# Patient Record
Sex: MALE | Race: BLACK OR AFRICAN AMERICAN | NOT HISPANIC OR LATINO | Employment: OTHER | RURAL
[De-identification: names, ages, dates, MRNs, and addresses within clinical notes are randomized per-mention and may not be internally consistent; named-entity substitution may affect disease eponyms.]

---

## 2022-02-22 ENCOUNTER — HOSPITAL ENCOUNTER (EMERGENCY)
Facility: HOSPITAL | Age: 62
Discharge: HOME | End: 2022-02-22
Attending: EMERGENCY MEDICINE
Payer: OTHER GOVERNMENT

## 2022-02-22 VITALS
BODY MASS INDEX: 28.49 KG/M2 | WEIGHT: 215 LBS | TEMPERATURE: 99 F | RESPIRATION RATE: 16 BRPM | OXYGEN SATURATION: 96 % | HEIGHT: 73 IN | HEART RATE: 68 BPM | DIASTOLIC BLOOD PRESSURE: 72 MMHG | SYSTOLIC BLOOD PRESSURE: 159 MMHG

## 2022-02-22 DIAGNOSIS — R07.9 CHEST PAIN, UNSPECIFIED TYPE: Primary | ICD-10-CM

## 2022-02-22 DIAGNOSIS — R10.9 ABDOMINAL PAIN: ICD-10-CM

## 2022-02-22 DIAGNOSIS — R07.9 CHEST PAIN: ICD-10-CM

## 2022-02-22 DIAGNOSIS — I10 HYPERTENSION, UNSPECIFIED TYPE: ICD-10-CM

## 2022-02-22 LAB
ALBUMIN SERPL BCP-MCNC: 3.4 G/DL (ref 3.5–5)
ALBUMIN/GLOB SERPL: 0.9 {RATIO}
ALP SERPL-CCNC: 113 U/L (ref 45–115)
ALT SERPL W P-5'-P-CCNC: 18 U/L (ref 16–61)
AMPHET UR QL SCN: NEGATIVE
ANION GAP SERPL CALCULATED.3IONS-SCNC: 9 MMOL/L (ref 7–16)
AST SERPL W P-5'-P-CCNC: 17 U/L (ref 15–37)
BARBITURATES UR QL SCN: NEGATIVE
BASOPHILS # BLD AUTO: 0.02 K/UL (ref 0–0.2)
BASOPHILS NFR BLD AUTO: 0.3 % (ref 0–1)
BENZODIAZ METAB UR QL SCN: POSITIVE
BILIRUB SERPL-MCNC: 0.2 MG/DL (ref 0–1.2)
BILIRUB UR QL STRIP: NEGATIVE
BUN SERPL-MCNC: 14 MG/DL (ref 7–18)
BUN/CREAT SERPL: 13 (ref 6–20)
CALCIUM SERPL-MCNC: 8.4 MG/DL (ref 8.5–10.1)
CANNABINOIDS UR QL SCN: NEGATIVE
CHLORIDE SERPL-SCNC: 106 MMOL/L (ref 98–107)
CLARITY UR: CLEAR
CO2 SERPL-SCNC: 27 MMOL/L (ref 21–32)
COCAINE UR QL SCN: NEGATIVE
COLOR UR: YELLOW
CREAT SERPL-MCNC: 1.1 MG/DL (ref 0.7–1.3)
D DIMER PPP FEU-MCNC: 1 ΜG/ML (ref 0–0.47)
DIFFERENTIAL METHOD BLD: ABNORMAL
EOSINOPHIL # BLD AUTO: 0.17 K/UL (ref 0–0.5)
EOSINOPHIL NFR BLD AUTO: 2.5 % (ref 1–4)
ERYTHROCYTE [DISTWIDTH] IN BLOOD BY AUTOMATED COUNT: 15.7 % (ref 11.5–14.5)
FLUAV AG UPPER RESP QL IA.RAPID: NEGATIVE
FLUBV AG UPPER RESP QL IA.RAPID: NEGATIVE
GLOBULIN SER-MCNC: 3.7 G/DL (ref 2–4)
GLUCOSE SERPL-MCNC: 89 MG/DL (ref 74–106)
GLUCOSE UR STRIP-MCNC: NEGATIVE MG/DL
HCT VFR BLD AUTO: 39 % (ref 40–54)
HGB BLD-MCNC: 13.2 G/DL (ref 13.5–18)
IMM GRANULOCYTES # BLD AUTO: 0.02 K/UL (ref 0–0.04)
IMM GRANULOCYTES NFR BLD: 0.3 % (ref 0–0.4)
KETONES UR STRIP-SCNC: NEGATIVE MG/DL
LEUKOCYTE ESTERASE UR QL STRIP: NEGATIVE
LYMPHOCYTES # BLD AUTO: 3.04 K/UL (ref 1–4.8)
LYMPHOCYTES NFR BLD AUTO: 43.9 % (ref 27–41)
MAGNESIUM SERPL-MCNC: 2.2 MG/DL (ref 1.7–2.3)
MCH RBC QN AUTO: 30.6 PG (ref 27–31)
MCHC RBC AUTO-ENTMCNC: 33.8 G/DL (ref 32–36)
MCV RBC AUTO: 90.3 FL (ref 80–96)
MONOCYTES # BLD AUTO: 0.78 K/UL (ref 0–0.8)
MONOCYTES NFR BLD AUTO: 11.3 % (ref 2–6)
MPC BLD CALC-MCNC: 9.7 FL (ref 9.4–12.4)
NEUTROPHILS # BLD AUTO: 2.89 K/UL (ref 1.8–7.7)
NEUTROPHILS NFR BLD AUTO: 41.7 % (ref 53–65)
NITRITE UR QL STRIP: NEGATIVE
NT-PROBNP SERPL-MCNC: 117 PG/ML (ref 1–125)
OPIATES UR QL SCN: POSITIVE
PCP UR QL SCN: NEGATIVE
PH UR STRIP: 7 PH UNITS
PLATELET # BLD AUTO: 287 K/UL (ref 150–400)
POTASSIUM SERPL-SCNC: 3.3 MMOL/L (ref 3.5–5.1)
PROT SERPL-MCNC: 7.1 G/DL (ref 6.4–8.2)
PROT UR QL STRIP: NEGATIVE
RBC # BLD AUTO: 4.32 M/UL (ref 4.6–6.2)
RBC # UR STRIP: NEGATIVE /UL
SARS-COV+SARS-COV-2 AG RESP QL IA.RAPID: NEGATIVE
SODIUM SERPL-SCNC: 139 MMOL/L (ref 136–145)
SP GR UR STRIP: 1.02
TROPONIN I SERPL HS-MCNC: 7.2 PG/ML
UROBILINOGEN UR STRIP-ACNC: 0.2 MG/DL
WBC # BLD AUTO: 6.92 K/UL (ref 4.5–11)

## 2022-02-22 PROCEDURE — 80307 DRUG TEST PRSMV CHEM ANLYZR: CPT | Performed by: EMERGENCY MEDICINE

## 2022-02-22 PROCEDURE — 99284 EMERGENCY DEPT VISIT MOD MDM: CPT | Mod: ,,, | Performed by: EMERGENCY MEDICINE

## 2022-02-22 PROCEDURE — 96360 HYDRATION IV INFUSION INIT: CPT | Mod: 59

## 2022-02-22 PROCEDURE — 81003 URINALYSIS AUTO W/O SCOPE: CPT | Performed by: EMERGENCY MEDICINE

## 2022-02-22 PROCEDURE — 93010 ELECTROCARDIOGRAM REPORT: CPT | Mod: ,,, | Performed by: EMERGENCY MEDICINE

## 2022-02-22 PROCEDURE — 85378 FIBRIN DEGRADE SEMIQUANT: CPT | Performed by: INTERNAL MEDICINE

## 2022-02-22 PROCEDURE — 99285 EMERGENCY DEPT VISIT HI MDM: CPT | Mod: 25

## 2022-02-22 PROCEDURE — 84484 ASSAY OF TROPONIN QUANT: CPT | Performed by: EMERGENCY MEDICINE

## 2022-02-22 PROCEDURE — 93010 EKG 12-LEAD: ICD-10-PCS | Mod: ,,, | Performed by: EMERGENCY MEDICINE

## 2022-02-22 PROCEDURE — 25000003 PHARM REV CODE 250: Performed by: EMERGENCY MEDICINE

## 2022-02-22 PROCEDURE — 36415 COLL VENOUS BLD VENIPUNCTURE: CPT | Performed by: EMERGENCY MEDICINE

## 2022-02-22 PROCEDURE — 93005 ELECTROCARDIOGRAM TRACING: CPT

## 2022-02-22 PROCEDURE — 80053 COMPREHEN METABOLIC PANEL: CPT | Performed by: EMERGENCY MEDICINE

## 2022-02-22 PROCEDURE — 83735 ASSAY OF MAGNESIUM: CPT | Performed by: EMERGENCY MEDICINE

## 2022-02-22 PROCEDURE — 83880 ASSAY OF NATRIURETIC PEPTIDE: CPT | Performed by: EMERGENCY MEDICINE

## 2022-02-22 PROCEDURE — 87428 SARSCOV & INF VIR A&B AG IA: CPT | Performed by: EMERGENCY MEDICINE

## 2022-02-22 PROCEDURE — 25500020 PHARM REV CODE 255: Performed by: INTERNAL MEDICINE

## 2022-02-22 PROCEDURE — 85025 COMPLETE CBC W/AUTO DIFF WBC: CPT | Performed by: EMERGENCY MEDICINE

## 2022-02-22 PROCEDURE — 99284 PR EMERGENCY DEPT VISIT,LEVEL IV: ICD-10-PCS | Mod: ,,, | Performed by: EMERGENCY MEDICINE

## 2022-02-22 RX ORDER — LOSARTAN POTASSIUM 100 MG/1
50 TABLET ORAL DAILY
COMMUNITY

## 2022-02-22 RX ORDER — SERTRALINE HYDROCHLORIDE 50 MG/1
50 TABLET, FILM COATED ORAL DAILY
COMMUNITY

## 2022-02-22 RX ORDER — ASCORBIC ACID 500 MG
500 TABLET ORAL DAILY
COMMUNITY

## 2022-02-22 RX ORDER — HYOSCYAMINE SULFATE 0.12 MG/1
TABLET, CHEWABLE ORAL
COMMUNITY
Start: 2021-11-12

## 2022-02-22 RX ORDER — ACETAMINOPHEN 325 MG/1
325 TABLET ORAL EVERY 6 HOURS PRN
COMMUNITY

## 2022-02-22 RX ORDER — ERGOCALCIFEROL 1.25 MG/1
50000 CAPSULE ORAL
COMMUNITY

## 2022-02-22 RX ORDER — PRAVASTATIN SODIUM 20 MG/1
20 TABLET ORAL DAILY
COMMUNITY

## 2022-02-22 RX ORDER — DILTIAZEM HYDROCHLORIDE 120 MG/1
180 CAPSULE, COATED, EXTENDED RELEASE ORAL 2 TIMES DAILY
COMMUNITY

## 2022-02-22 RX ORDER — DOXYCYCLINE 100 MG/1
100 CAPSULE ORAL 2 TIMES DAILY
COMMUNITY
End: 2022-10-01

## 2022-02-22 RX ORDER — ASPIRIN 325 MG
325 TABLET ORAL DAILY
COMMUNITY

## 2022-02-22 RX ORDER — DICYCLOMINE HYDROCHLORIDE 20 MG/1
20 TABLET ORAL 3 TIMES DAILY PRN
COMMUNITY
Start: 2021-11-30

## 2022-02-22 RX ORDER — CLONIDINE HYDROCHLORIDE 0.1 MG/1
0.1 TABLET ORAL 3 TIMES DAILY
COMMUNITY

## 2022-02-22 RX ADMIN — IOPAMIDOL 100 ML: 755 INJECTION, SOLUTION INTRAVENOUS at 08:02

## 2022-02-22 RX ADMIN — SODIUM CHLORIDE 1000 ML: 9 INJECTION, SOLUTION INTRAVENOUS at 06:02

## 2022-02-23 NOTE — ED NOTES
Pt continues to await results and ERP orders no change in pt status. Pain is a 5 on 0-10 scale. Wife at bedside.

## 2022-02-23 NOTE — ED PROVIDER NOTES
Encounter Date: 2/22/2022       History     Chief Complaint   Patient presents with    Chest Pain    Shortness of Breath    Weakness    Nausea     Pt srtates he has a headache nauseated chest pain weakness and sob onset 2-3 weeks worse today     HPI  Review of patient's allergies indicates:  No Known Allergies  Past Medical History:   Diagnosis Date    Anticoagulant long-term use     Coronary artery disease     Depression     GERD (gastroesophageal reflux disease)     Hypertension     Migraine headache      Past Surgical History:   Procedure Laterality Date    adrenal gland mass      HEMORRHOID SURGERY      right fusion ankle Right     rotator cuff JOHN surgery       History reviewed. No pertinent family history.  Social History     Tobacco Use    Smoking status: Current Every Day Smoker     Types: Cigarettes    Smokeless tobacco: Never Used   Substance Use Topics    Alcohol use: Yes    Drug use: Not Currently     Comment: as a kid     Review of Systems    Physical Exam     Initial Vitals [02/22/22 1811]   BP Pulse Resp Temp SpO2   (!) 154/85 85 16 98.6 °F (37 °C) 98 %      MAP       --         Physical Exam    Medical Screening Exam   See Full Note    ED Course   Procedures  Labs Reviewed   COMPREHENSIVE METABOLIC PANEL - Abnormal; Notable for the following components:       Result Value    Potassium 3.3 (*)     Calcium 8.4 (*)     Albumin 3.4 (*)     All other components within normal limits   DRUG SCREEN, URINE (BEAKER) - Abnormal; Notable for the following components:    Benzodiazepine, Urine Positive (*)     Opiates, Urine Positive (*)     All other components within normal limits    Narrative:     The results of screening tests should be considered presumptive. Confirmatory testing is available upon request.    Cutoff Points:  PCP:         25ng/mL  AMPH:        500ng/mL  SIGIFREDO:        200ng/mL  JAN:        200ng/mL  THC:         50ng/mL  ROME:         300ng/mL  OPI:         2000ng/mL   CBC WITH  DIFFERENTIAL - Abnormal; Notable for the following components:    RBC 4.32 (*)     Hemoglobin 13.2 (*)     Hematocrit 39.0 (*)     RDW 15.7 (*)     Neutrophils % 41.7 (*)     Lymphocytes % 43.9 (*)     Monocytes % 11.3 (*)     All other components within normal limits   D DIMER, QUANTITATIVE - Abnormal; Notable for the following components:    D-Dimer 1.00 (*)     All other components within normal limits   NT-PRO NATRIURETIC PEPTIDE - Normal   MAGNESIUM - Normal   URINALYSIS, REFLEX TO URINE CULTURE - Normal   TROPONIN I - Normal   SARS-COV2 (COVID) W/ FLU ANTIGEN - Normal    Narrative:     Negative SARS-CoV results should not be used as the sole basis for treatment or patient management decisions; negative results should be considered in the context of a patient's recent exposures, history and the presene of clinical signs and symptoms consistent with COVID-19.  Negative results should be treated as presumptive and confirmed by molecular assay, if necessary for patient management.   CBC W/ AUTO DIFFERENTIAL    Narrative:     The following orders were created for panel order CBC auto differential.  Procedure                               Abnormality         Status                     ---------                               -----------         ------                     CBC with Differential[[id number]]        Abnormal            Final result                 Please view results for these tests on the individual orders.        ECG Results          EKG 12-lead (Final result)  Result time 02/22/22 19:08:18    Final result by Interface, Lab In [facility] (02/22/22 19:08:18)                 Narrative:    Test Reason : R07.9,    Vent. Rate : 082 BPM     Atrial Rate : 082 BPM     P-R Int : 124 ms          QRS Dur : 082 ms      QT Int : 384 ms       P-R-T Axes : 039 004 027 degrees     QTc Int : 448 ms    Normal sinus rhythm  Normal ECG  No previous ECGs available  Confirmed by Uday Redding DO ([id number]) on 2/22/2022 7:08:09  PM    Referred By: [email or web address]   SELF           Confirmed By:Uday Redding DO                            Imaging Results          CTA Chest Non-Coronary (PE Study) (Preliminary result)  Result time 02/22/22 21:29:45    ED Interpretation by Moiz Mosqueda MD (02/22/22 21:29:45, [facility] Emergency Department, Emergency Medicine)    CT SCAN OF THE CHEST SHOWS NO PULMONARY EMBOLUS OR PNEUMONIA, BUT POSSIBLY THYROID GOITER.                             X-Ray Abdomen Flat And Erect (Final result)  Result time 02/22/22 19:03:17    Final result by Claudio Blas DO (02/22/22 19:03:17)                 Impression:      As above.      Electronically signed by: Claudio Blas  Date:    02/22/2022  Time:    19:03             Narrative:    EXAMINATION:  XR ABDOMEN FLAT AND ERECT    CLINICAL HISTORY:  Unspecified abdominal pain    TECHNIQUE:  XR ABDOMEN FLAT AND ERECT    COMPARISON:  Comparisons were reviewed, if available.    FINDINGS:  Lower lobes are clear    There is no bowel obstruction.  No free air.  No renal calculi.  Mild to moderate colonic stool.    Liver and renal silhouettes are normal.    No acute bone findings.                               X-Ray Chest AP Portable (Final result)  Result time 02/22/22 19:02:23    Final result by Claudio Blas DO (02/22/22 19:02:23)                 Impression:      As above.      Electronically signed by: Claudio Blas  Date:    02/22/2022  Time:    19:02             Narrative:    EXAMINATION:  XR CHEST AP PORTABLE    CLINICAL HISTORY:  injury;    TECHNIQUE:  XR CHEST AP PORTABLE    COMPARISON:  Comparisons were reviewed, if available.    FINDINGS:  No lines or tubes.    Bibasilar airspace opacities, nonspecific.  Correlate clinically with need for further imaging    Normal pleura.    Cardiac silhouette is similar to comparison exam.    No new acute bone findings.                                 Medications   sodium chloride 0.9% bolus 1,000 mL (0 mLs  Intravenous Stopped 2/22/22 2012)   iopamidoL (ISOVUE-370) injection 100 mL (100 mLs Intravenous Given 2/22/22 2044)                 ED Course as of 02/22/22 2139 Tue Feb 22, 2022 1908 EKG 12-lead [PW]   1909 X-Ray Chest AP Portable [PW]   1909 X-Ray Abdomen Flat And Erect [PW]   1930 Troponin I [PW]   1930 Magnesium: 2.2 [PW]   1930 NT-proBNP: 117 [PW]   1930 CBC auto differential(!) [PW]   1930 Comprehensive metabolic panel(!) [PW]   1930 Troponin I High Sensitivity: 7.2 [PW]   1937 Benzodiazepines(!): Positive [PW]   1937 Opiate Scrn, Ur(!): Positive [PW]   1937 COVID-19 Ag: Negative [PW]   2012 D dimer, quantitative(!) [PW]   2012 D-Dimer(!): 1.00 [PW]   2129 CTA Chest Non-Coronary (PE Study) [PW]   2131 PATIENT STATES HE SEES DR. CLARK IN [facility].  HE STATES HE HAD A STRESS TEST AND HEART WORK[facility] LA[facility]. [PW]      ED Course User Index  [PW] Moiz Mosqueda MD          Clinical Impression:   Final diagnoses:  [R07.9] Chest pain  [R10.9] Abdominal pain  [R07.9] Chest pain, unspecified type (Primary)  [I10] Hypertension, unspecified type          ED Disposition Condition    Discharge Stable        ED Prescriptions     None        Follow-up Information     Follow up With Specialties Details Why Contact Info    DR CLARK  In 1 day             Moiz Mosqueda MD  02/22/22 2139

## 2022-02-23 NOTE — ED NOTES
Pt in room waiting on lab results no change in status noted. Family at bedside medications reviewed from home and updated in chart

## 2022-02-23 NOTE — ED PROVIDER NOTES
Encounter Date: 2/22/2022       History     Chief Complaint   Patient presents with    Chest Pain    Shortness of Breath    Weakness    Nausea     Pt srtates he has a headache nauseated chest pain weakness and sob onset 2-3 weeks worse today     Patient presents via EMS with report of feeling sick all over.  Patient initially mainly complained of chest pain, but also states his stomach has been hurting him he has had body aches, poor appetite, all for the past several weeks intermittently.  States his symptoms got worse today.  No shortness of breath, no current chest pain.  [facility] sees Dr. Garvey in [place].  He has not seen his primary physician for this problem over the past several weeks.  Patient reports he had an ankle fusion done on the right ankle several weeks ago, he was seen yesterday by his orthopedist at [facility] in [place] and had bandages and sutures removed after having had his pins removed 2 weeks ago.        Review of patient's allergies indicates:  No Known Allergies  Past Medical History:   Diagnosis Date    Anticoagulant long-term use     Coronary artery disease     Depression     GERD (gastroesophageal reflux disease)     Hypertension     Migraine headache      Past Surgical History:   Procedure Laterality Date    adrenal gland mass      HEMORRHOID SURGERY      right fusion ankle Right     rotator cuff JOHN surgery       History reviewed. No pertinent family history.  Social History     Tobacco Use    Smoking status: Current Every Day Smoker     Types: Cigarettes    Smokeless tobacco: Never Used   Substance Use Topics    Alcohol use: Yes    Drug use: Not Currently     Comment: as a kid     Review of Systems   Constitutional: Positive for activity change (Reports decreased activity), appetite change ( reports decreased appetite for several weeks) and fatigue. Negative for chills, diaphoresis, fever and unexpected weight change.   HENT: Negative.     Eyes: Negative.    Respiratory: Negative.  Negative for apnea, cough, choking, chest tightness, shortness of breath, wheezing and stridor.    Cardiovascular: Positive for chest pain ( reports nonspecific intermittent chest pain, none currently). Negative for palpitations and leg swelling.   Gastrointestinal: Positive for abdominal pain ( reports generalized intermittent abdominal pains.) and nausea. Negative for abdominal distention, blood in stool, constipation, diarrhea and vomiting.   Genitourinary: Negative.    Musculoskeletal: Negative for back pain, gait problem, joint swelling, neck pain and neck stiffness.        Reports malaise and body aches for several weeks.   Skin: Negative.    Neurological: Negative.  Negative for dizziness, tremors, seizures, syncope, facial asymmetry, speech difficulty, light-headedness, numbness and headaches.        No focal deficits reported, does report mild generalized weakness for several weeks.   Hematological: Negative.    Psychiatric/Behavioral: Negative.        Physical Exam     Initial Vitals [02/22/22 1811]   BP Pulse Resp Temp SpO2   (!) 154/85 85 16 98.6 °F (37 °C) 98 %      MAP       --         Physical Exam    Nursing note and vitals reviewed.  Constitutional: He appears well-developed and well-nourished. He is not diaphoretic. No distress.   HENT:   Head: Normocephalic and atraumatic.   Right Ear: External ear normal.   Left Ear: External ear normal.   Nose: Nose normal.   Mouth/Throat: Oropharynx is clear and moist. No oropharyngeal exudate.   Eyes: Conjunctivae and EOM are normal. Pupils are equal, round, and reactive to light. Right eye exhibits no discharge. Left eye exhibits no discharge.   Neck: Neck supple. No tracheal deviation present. No JVD present.   Normal range of motion.  Cardiovascular: Normal rate, regular rhythm, normal heart sounds and intact distal pulses. Exam reveals no friction rub.    No murmur heard.  Pulmonary/Chest: Breath sounds normal.  No stridor. No respiratory distress. He has no wheezes. He has no rhonchi. He has no rales.   Abdominal: Abdomen is soft. Bowel sounds are normal. He exhibits no distension and no mass. There is no abdominal tenderness.   Musculoskeletal:         General: No tenderness or edema. Normal range of motion.      Cervical back: Normal range of motion and neck supple.     Lymphadenopathy:     He has no cervical adenopathy.   Neurological: He is alert and oriented to person, place, and time. He has normal strength. No cranial nerve deficit or sensory deficit. GCS score is 15. GCS eye subscore is 4. GCS verbal subscore is 5. GCS motor subscore is 6.   Skin: Skin is warm and dry. Capillary refill takes less than 2 seconds. No rash noted. No erythema. No pallor.   Psychiatric: He has a normal mood and affect. His behavior is normal.         Medical Screening Exam   See Full Note    ED Course   Procedures  Labs Reviewed   COMPREHENSIVE METABOLIC PANEL - Abnormal; Notable for the following components:       Result Value    Potassium 3.3 (*)     Calcium 8.4 (*)     Albumin 3.4 (*)     All other components within normal limits   DRUG SCREEN, URINE (BEAKER) - Abnormal; Notable for the following components:    Benzodiazepine, Urine Positive (*)     Opiates, Urine Positive (*)     All other components within normal limits    Narrative:     The results of screening tests should be considered presumptive. Confirmatory testing is available upon request.    Cutoff Points:  PCP:         25ng/mL  AMPH:        500ng/mL  SIGIFREDO:        200ng/mL  JAN:        200ng/mL  THC:         50ng/mL  ROME:         300ng/mL  OPI:         2000ng/mL   CBC WITH DIFFERENTIAL - Abnormal; Notable for the following components:    RBC 4.32 (*)     Hemoglobin 13.2 (*)     Hematocrit 39.0 (*)     RDW 15.7 (*)     Neutrophils % 41.7 (*)     Lymphocytes % 43.9 (*)     Monocytes % 11.3 (*)     All other components within normal limits   D DIMER, QUANTITATIVE -  Abnormal; Notable for the following components:    D-Dimer 1.00 (*)     All other components within normal limits   NT-PRO NATRIURETIC PEPTIDE - Normal   MAGNESIUM - Normal   URINALYSIS, REFLEX TO URINE CULTURE - Normal   TROPONIN I - Normal   SARS-COV2 (COVID) W/ FLU ANTIGEN - Normal    Narrative:     Negative SARS-CoV results should not be used as the sole basis for treatment or patient management decisions; negative results should be considered in the context of a patient's recent exposures, history and the presene of clinical signs and symptoms consistent with COVID-19.  Negative results should be treated as presumptive and confirmed by molecular assay, if necessary for patient management.   CBC W/ AUTO DIFFERENTIAL    Narrative:     The following orders were created for panel order CBC auto differential.  Procedure                               Abnormality         Status                     ---------                               -----------         ------                     CBC with Differential[[id number]]        Abnormal            Final result                 Please view results for these tests on the individual orders.        ECG Results          EKG 12-lead (Final result)  Result time 02/23/22 15:55:30    Final result by Kathe Guido (02/23/22 15:55:30)                 Narrative:    Test Reason : R07.9,    Vent. Rate : 082 BPM     Atrial Rate : 082 BPM     P-R Int : 124 ms          QRS Dur : 082 ms      QT Int : 384 ms       P-R-T Axes : 039 004 027 degrees     QTc Int : 448 ms    Normal sinus rhythm  Normal ECG  No previous ECGs available  Confirmed by Uday Redding DO ([id number]) on 2/22/2022 7:08:09 PM    Referred By: [email or web address]   SELF           Confirmed By:Uday Redding DO                            Imaging Results          CTA Chest Non-Coronary (PE Study) (Final result)  Result time 02/23/22 07:41:12    Final result by Claudio Blas DO (02/23/22 07:41:12)                 Impression:       1. No evidence to suggest pulmonary embolism.    2.  diffuse ground-glass attenuation, probably atelectasis    Right adrenal adenoma    Enlarged thyroid gland, correlate with need for further imaging      Electronically signed by: Claudio Blas  Date:    02/23/2022  Time:    07:41             Narrative:    EXAMINATION:  CTA CHEST NON CORONARY    CLINICAL HISTORY:  Pulmonary embolism (PE) suspected, high prob;    TECHNIQUE:  Multiplanar CT of the chest with PE protocol.  MIP projections obtained.  This exam is adequate for interpretation.    COMPARISON:  02/22/202242    FINDINGS:  Pulmonary artery: Normal    Lower neck: Enlarged thyroid gland    Chest/airways: Diffuse ground-glass attenuation    Mediastinum: Normal    Chest wall: Normal    Bones: Degenerative change    Upper abdomen: 3.3 cm right adrenal adenoma                    ED Interpretation by Moiz Mosqueda MD (02/22/22 21:29:45, [facility] Emergency Department, Emergency Medicine)    CT SCAN OF THE CHEST SHOWS NO PULMONARY EMBOLUS OR PNEUMONIA, BUT POSSIBLY THYROID GOITER.                             X-Ray Abdomen Flat And Erect (Final result)  Result time 02/22/22 19:03:17    Final result by Claudio Blas DO (02/22/22 19:03:17)                 Impression:      As above.      Electronically signed by: Claudio Blas  Date:    02/22/2022  Time:    19:03             Narrative:    EXAMINATION:  XR ABDOMEN FLAT AND ERECT    CLINICAL HISTORY:  Unspecified abdominal pain    TECHNIQUE:  XR ABDOMEN FLAT AND ERECT    COMPARISON:  Comparisons were reviewed, if available.    FINDINGS:  Lower lobes are clear    There is no bowel obstruction.  No free air.  No renal calculi.  Mild to moderate colonic stool.    Liver and renal silhouettes are normal.    No acute bone findings.                               X-Ray Chest AP Portable (Final result)  Result time 02/22/22 19:02:23    Final result by Claudio Blas DO (02/22/22 19:02:23)                  Impression:      As above.      Electronically signed by: Claudio Blas  Date:    02/22/2022  Time:    19:02             Narrative:    EXAMINATION:  XR CHEST AP PORTABLE    CLINICAL HISTORY:  injury;    TECHNIQUE:  XR CHEST AP PORTABLE    COMPARISON:  Comparisons were reviewed, if available.    FINDINGS:  No lines or tubes.    Bibasilar airspace opacities, nonspecific.  Correlate clinically with need for further imaging    Normal pleura.    Cardiac silhouette is similar to comparison exam.    No new acute bone findings.                                 Medications   sodium chloride 0.9% bolus 1,000 mL (0 mLs Intravenous Stopped 2/22/22 2012)   iopamidoL (ISOVUE-370) injection 100 mL (100 mLs Intravenous Given 2/22/22 2044)     Medical Decision Making:   Independently Interpreted Test(s):   I have ordered and independently interpreted X-rays - see summary below.       <> Summary of X-Ray Reading(s): Chest x-ray shows no acute process, normal heart size, clear lung fields.  Flat upright abdomen shows no free air, no air-fluid levels no evidence of obstruction.  There is increased stool throughout the colon.  Other:   I have discussed this case with another health care provider.       <> Summary of the Discussion: Patient care transferred to Dr. Mosqueda at this time, lab results are pending.             ED Course as of 02/23/22 2258 Tue Feb 22, 2022 1908 EKG 12-lead [PW]   1909 X-Ray Chest AP Portable [PW]   1909 X-Ray Abdomen Flat And Erect [PW]   1930 Troponin I [PW]   1930 Magnesium: 2.2 [PW]   1930 NT-proBNP: 117 [PW]   1930 CBC auto differential(!) [PW]   1930 Comprehensive metabolic panel(!) [PW]   1930 Troponin I High Sensitivity: 7.2 [PW]   1937 Benzodiazepines(!): Positive [PW]   1937 Opiate Scrn, Ur(!): Positive [PW]   1937 COVID-19 Ag: Negative [PW]   2012 D dimer, quantitative(!) [PW]   2012 D-Dimer(!): 1.00 [PW]   2129 CTA Chest Non-Coronary (PE Study) [PW]   2131 PATIENT STATES HE SEES DR. CLARK IN  [place] AND [facility].  HE STATES HE HAD A STRESS TEST AND HEART [facility] [PW]      ED Course User Index  [PW] Moiz Mosqueda MD          Clinical Impression:   Final diagnoses:  [R07.9] Chest pain  [R10.9] Abdominal pain  [R07.9] Chest pain, unspecified type (Primary)  [I10] Hypertension, unspecified type          ED Disposition Condition    Discharge Stable        ED Prescriptions     None        Follow-up Information     Follow up With Specialties Details Why Contact Info    DR CLARK  In 1 day             Uday Redding DO  02/23/22 0869

## 2022-02-23 NOTE — ED TRIAGE NOTES
Pt states he has c/o of chest pain SOB weakness headache nausea abdominal pain states pain in a 7 on 0-10 scale. States whole body feels horrible. Pt thinks he is dehydrated. Pt arrived by EMS [facility] with IV intact to left FA 18ga with NS infusing. Pt states symptoms onset 2-3 weeks worse today. Pt states he was seen at [facility] yesterday and felt fine no problems there while he was getting the stitches and bandages off his injury to right ankle. Hardware was taken out 3 weeks ago. From [facility]. Pt arrived vital taken and EKG completed . Pt states he has been very stressed lately and depressed.

## 2022-02-24 ENCOUNTER — TELEPHONE (OUTPATIENT)
Dept: EMERGENCY MEDICINE | Facility: HOSPITAL | Age: 62
End: 2022-02-24
Payer: OTHER GOVERNMENT

## 2022-06-25 ENCOUNTER — HOSPITAL ENCOUNTER (EMERGENCY)
Facility: HOSPITAL | Age: 62
Discharge: HOME | End: 2022-06-25
Attending: FAMILY MEDICINE
Payer: OTHER GOVERNMENT

## 2022-06-25 VITALS
DIASTOLIC BLOOD PRESSURE: 93 MMHG | HEART RATE: 68 BPM | BODY MASS INDEX: 27.1 KG/M2 | RESPIRATION RATE: 11 BRPM | OXYGEN SATURATION: 97 % | SYSTOLIC BLOOD PRESSURE: 145 MMHG | WEIGHT: 204.5 LBS | HEIGHT: 73 IN | TEMPERATURE: 98 F

## 2022-06-25 DIAGNOSIS — F41.9 CHRONIC ANXIETY: ICD-10-CM

## 2022-06-25 DIAGNOSIS — F32.A CHRONIC DEPRESSION: ICD-10-CM

## 2022-06-25 DIAGNOSIS — R07.9 CHEST PAIN: ICD-10-CM

## 2022-06-25 DIAGNOSIS — R06.02 SHORTNESS OF BREATH: ICD-10-CM

## 2022-06-25 DIAGNOSIS — R07.9 CHEST PAIN, UNSPECIFIED TYPE: Primary | ICD-10-CM

## 2022-06-25 DIAGNOSIS — R53.83 FATIGUE, UNSPECIFIED TYPE: ICD-10-CM

## 2022-06-25 LAB
ALBUMIN SERPL BCP-MCNC: 3.4 G/DL (ref 3.5–5)
ALBUMIN/GLOB SERPL: 0.9 {RATIO}
ALP SERPL-CCNC: 94 U/L (ref 45–115)
ALT SERPL W P-5'-P-CCNC: 22 U/L (ref 16–61)
ANION GAP SERPL CALCULATED.3IONS-SCNC: 11 MMOL/L (ref 7–16)
AST SERPL W P-5'-P-CCNC: 16 U/L (ref 15–37)
BASOPHILS # BLD AUTO: 0.04 K/UL (ref 0–0.2)
BASOPHILS NFR BLD AUTO: 0.6 % (ref 0–1)
BILIRUB SERPL-MCNC: 0.4 MG/DL (ref 0–1.2)
BUN SERPL-MCNC: 11 MG/DL (ref 7–18)
BUN/CREAT SERPL: 9 (ref 6–20)
CALCIUM SERPL-MCNC: 8.7 MG/DL (ref 8.5–10.1)
CHLORIDE SERPL-SCNC: 103 MMOL/L (ref 98–107)
CO2 SERPL-SCNC: 27 MMOL/L (ref 21–32)
CREAT SERPL-MCNC: 1.28 MG/DL (ref 0.7–1.3)
DIFFERENTIAL METHOD BLD: ABNORMAL
EOSINOPHIL # BLD AUTO: 0.12 K/UL (ref 0–0.5)
EOSINOPHIL NFR BLD AUTO: 1.7 % (ref 1–4)
ERYTHROCYTE [DISTWIDTH] IN BLOOD BY AUTOMATED COUNT: 14.9 % (ref 11.5–14.5)
FLUAV AG UPPER RESP QL IA.RAPID: NEGATIVE
FLUBV AG UPPER RESP QL IA.RAPID: NEGATIVE
GLOBULIN SER-MCNC: 4 G/DL (ref 2–4)
GLUCOSE SERPL-MCNC: 89 MG/DL (ref 74–106)
HCT VFR BLD AUTO: 43.1 % (ref 40–54)
HGB BLD-MCNC: 14.9 G/DL (ref 13.5–18)
IMM GRANULOCYTES # BLD AUTO: 0 K/UL (ref 0–0.04)
IMM GRANULOCYTES NFR BLD: 0 % (ref 0–0.4)
LYMPHOCYTES # BLD AUTO: 3.24 K/UL (ref 1–4.8)
LYMPHOCYTES NFR BLD AUTO: 44.7 % (ref 27–41)
MCH RBC QN AUTO: 30.4 PG (ref 27–31)
MCHC RBC AUTO-ENTMCNC: 34.6 G/DL (ref 32–36)
MCV RBC AUTO: 88 FL (ref 80–96)
MONOCYTES # BLD AUTO: 0.68 K/UL (ref 0–0.8)
MONOCYTES NFR BLD AUTO: 9.4 % (ref 2–6)
MPC BLD CALC-MCNC: 10.1 FL (ref 9.4–12.4)
NEUTROPHILS # BLD AUTO: 3.17 K/UL (ref 1.8–7.7)
NEUTROPHILS NFR BLD AUTO: 43.6 % (ref 53–65)
NT-PROBNP SERPL-MCNC: 58 PG/ML (ref 1–125)
PLATELET # BLD AUTO: 237 K/UL (ref 150–400)
POTASSIUM SERPL-SCNC: 3.1 MMOL/L (ref 3.5–5.1)
PROT SERPL-MCNC: 7.4 G/DL (ref 6.4–8.2)
RBC # BLD AUTO: 4.9 M/UL (ref 4.6–6.2)
SARS-COV+SARS-COV-2 AG RESP QL IA.RAPID: NEGATIVE
SODIUM SERPL-SCNC: 138 MMOL/L (ref 136–145)
TROPONIN I SERPL HS-MCNC: 7.9 PG/ML
WBC # BLD AUTO: 7.25 K/UL (ref 4.5–11)

## 2022-06-25 PROCEDURE — 25000003 PHARM REV CODE 250: Performed by: FAMILY MEDICINE

## 2022-06-25 PROCEDURE — 87428 SARSCOV & INF VIR A&B AG IA: CPT | Performed by: FAMILY MEDICINE

## 2022-06-25 PROCEDURE — 93010 EKG 12-LEAD: ICD-10-PCS | Mod: ,,, | Performed by: INTERNAL MEDICINE

## 2022-06-25 PROCEDURE — 63600175 PHARM REV CODE 636 W HCPCS: Performed by: FAMILY MEDICINE

## 2022-06-25 PROCEDURE — 80053 COMPREHEN METABOLIC PANEL: CPT | Performed by: FAMILY MEDICINE

## 2022-06-25 PROCEDURE — 99283 PR EMERGENCY DEPT VISIT,LEVEL III: ICD-10-PCS | Mod: ,,, | Performed by: FAMILY MEDICINE

## 2022-06-25 PROCEDURE — 93010 ELECTROCARDIOGRAM REPORT: CPT | Mod: ,,, | Performed by: INTERNAL MEDICINE

## 2022-06-25 PROCEDURE — 83880 ASSAY OF NATRIURETIC PEPTIDE: CPT | Performed by: FAMILY MEDICINE

## 2022-06-25 PROCEDURE — 96374 THER/PROPH/DIAG INJ IV PUSH: CPT

## 2022-06-25 PROCEDURE — 84484 ASSAY OF TROPONIN QUANT: CPT | Performed by: FAMILY MEDICINE

## 2022-06-25 PROCEDURE — 93005 ELECTROCARDIOGRAM TRACING: CPT

## 2022-06-25 PROCEDURE — 85025 COMPLETE CBC W/AUTO DIFF WBC: CPT | Performed by: FAMILY MEDICINE

## 2022-06-25 PROCEDURE — 99285 EMERGENCY DEPT VISIT HI MDM: CPT | Mod: 25

## 2022-06-25 PROCEDURE — 99283 EMERGENCY DEPT VISIT LOW MDM: CPT | Mod: ,,, | Performed by: FAMILY MEDICINE

## 2022-06-25 PROCEDURE — 36415 COLL VENOUS BLD VENIPUNCTURE: CPT | Performed by: FAMILY MEDICINE

## 2022-06-25 PROCEDURE — 96375 TX/PRO/DX INJ NEW DRUG ADDON: CPT

## 2022-06-25 RX ORDER — MAG HYDROX/ALUMINUM HYD/SIMETH 200-200-20
30 SUSPENSION, ORAL (FINAL DOSE FORM) ORAL ONCE
Status: COMPLETED | OUTPATIENT
Start: 2022-06-26 | End: 2022-06-25

## 2022-06-25 RX ORDER — ASPIRIN 325 MG
325 TABLET ORAL
Status: COMPLETED | OUTPATIENT
Start: 2022-06-25 | End: 2022-06-25

## 2022-06-25 RX ORDER — LIDOCAINE HYDROCHLORIDE 20 MG/ML
10 SOLUTION OROPHARYNGEAL ONCE
Status: COMPLETED | OUTPATIENT
Start: 2022-06-26 | End: 2022-06-25

## 2022-06-25 RX ORDER — DEXAMETHASONE SODIUM PHOSPHATE 4 MG/ML
4 INJECTION, SOLUTION INTRA-ARTICULAR; INTRALESIONAL; INTRAMUSCULAR; INTRAVENOUS; SOFT TISSUE
Status: DISCONTINUED | OUTPATIENT
Start: 2022-06-25 | End: 2022-06-25

## 2022-06-25 RX ORDER — DEXAMETHASONE SODIUM PHOSPHATE 4 MG/ML
4 INJECTION, SOLUTION INTRA-ARTICULAR; INTRALESIONAL; INTRAMUSCULAR; INTRAVENOUS; SOFT TISSUE
Status: COMPLETED | OUTPATIENT
Start: 2022-06-25 | End: 2022-06-25

## 2022-06-25 RX ORDER — FAMOTIDINE 10 MG/ML
20 INJECTION INTRAVENOUS
Status: COMPLETED | OUTPATIENT
Start: 2022-06-25 | End: 2022-06-25

## 2022-06-25 RX ADMIN — ALUMINUM HYDROXIDE, MAGNESIUM HYDROXIDE, AND SIMETHICONE 30 ML: 200; 200; 20 SUSPENSION ORAL at 11:06

## 2022-06-25 RX ADMIN — FAMOTIDINE 20 MG: 10 INJECTION, SOLUTION INTRAVENOUS at 11:06

## 2022-06-25 RX ADMIN — DEXAMETHASONE SODIUM PHOSPHATE 4 MG: 4 INJECTION, SOLUTION INTRAMUSCULAR; INTRAVENOUS at 11:06

## 2022-06-25 RX ADMIN — ASPIRIN 325 MG ORAL TABLET 325 MG: 325 PILL ORAL at 09:06

## 2022-06-25 RX ADMIN — LIDOCAINE HYDROCHLORIDE 10 ML: 20 SOLUTION ORAL; TOPICAL at 11:06

## 2022-06-26 ENCOUNTER — TELEPHONE (OUTPATIENT)
Dept: EMERGENCY MEDICINE | Facility: HOSPITAL | Age: 62
End: 2022-06-26
Payer: OTHER GOVERNMENT

## 2022-06-26 NOTE — ED NOTES
Pt states he is having problems with Anxiety and depression. Pt tells Dr. Reeves he has diarrhea every day some days more than once.
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
## 2022-06-26 NOTE — ED NOTES
Pt tells Nurse he has tried CBD Gummies and oils to try and help with Pains all over..feet and stomach.

## 2022-06-26 NOTE — PROVIDER PROGRESS NOTES - EMERGENCY DEPT.
Encounter Date: 6/25/2022    ED Physician Progress Notes        Physician Note:   Radiologist reading of CT Chest-Abd-Pelvis finds no acute process in the chest, abdomen or pelvis.  There are some old calcified granulomas and a few mild chronic markings in the lughs, a 3.4 cm low-density nodule of the right renal gland consistent with a benign adenoma, denenerative disc changes at L5-S1, and aneurysmal dilitation of ascending thoracic aorta to 4.0 cm.

## 2022-06-26 NOTE — ED TRIAGE NOTES
Pt states he has pain in his chest, abd and legs for 5 days to a week. Reports nothing he does relieves his pain. States he just cant function. Pt reports having headaches, and his thoughts are everywhere. Pt drove himself to the hospital.

## 2022-10-01 ENCOUNTER — HOSPITAL ENCOUNTER (EMERGENCY)
Facility: HOSPITAL | Age: 62
Discharge: HOME | End: 2022-10-01
Attending: SPECIALIST
Payer: OTHER GOVERNMENT

## 2022-10-01 VITALS
TEMPERATURE: 98 F | BODY MASS INDEX: 28.2 KG/M2 | HEART RATE: 77 BPM | SYSTOLIC BLOOD PRESSURE: 141 MMHG | RESPIRATION RATE: 18 BRPM | WEIGHT: 212.81 LBS | DIASTOLIC BLOOD PRESSURE: 82 MMHG | HEIGHT: 73 IN | OXYGEN SATURATION: 98 %

## 2022-10-01 DIAGNOSIS — F32.A CHRONIC DEPRESSION: ICD-10-CM

## 2022-10-01 DIAGNOSIS — F41.9 CHRONIC ANXIETY: ICD-10-CM

## 2022-10-01 DIAGNOSIS — R07.89 ATYPICAL CHEST PAIN: Primary | ICD-10-CM

## 2022-10-01 LAB
ALBUMIN SERPL BCP-MCNC: 3.5 G/DL (ref 3.5–5)
ALBUMIN/GLOB SERPL: 1 {RATIO}
ALP SERPL-CCNC: 73 U/L (ref 45–115)
ALT SERPL W P-5'-P-CCNC: 22 U/L (ref 16–61)
ANION GAP SERPL CALCULATED.3IONS-SCNC: 10 MMOL/L (ref 7–16)
AST SERPL W P-5'-P-CCNC: 15 U/L (ref 15–37)
BASOPHILS # BLD AUTO: 0.02 K/UL (ref 0–0.2)
BASOPHILS NFR BLD AUTO: 0.2 % (ref 0–1)
BILIRUB SERPL-MCNC: 0.3 MG/DL (ref ?–1.2)
BILIRUB UR QL STRIP: NEGATIVE
BUN SERPL-MCNC: 16 MG/DL (ref 7–18)
BUN/CREAT SERPL: 14 (ref 6–20)
CALCIUM SERPL-MCNC: 8.9 MG/DL (ref 8.5–10.1)
CHLORIDE SERPL-SCNC: 103 MMOL/L (ref 98–107)
CLARITY UR: CLEAR
CO2 SERPL-SCNC: 31 MMOL/L (ref 21–32)
COLOR UR: YELLOW
CREAT SERPL-MCNC: 1.14 MG/DL (ref 0.7–1.3)
DIFFERENTIAL METHOD BLD: ABNORMAL
EGFR (NO RACE VARIABLE) (RUSH/TITUS): 73 ML/MIN/1.73M²
EOSINOPHIL # BLD AUTO: 0.06 K/UL (ref 0–0.5)
EOSINOPHIL NFR BLD AUTO: 0.6 % (ref 1–4)
ERYTHROCYTE [DISTWIDTH] IN BLOOD BY AUTOMATED COUNT: 15.4 % (ref 11.5–14.5)
FLUAV AG UPPER RESP QL IA.RAPID: NEGATIVE
FLUBV AG UPPER RESP QL IA.RAPID: NEGATIVE
GLOBULIN SER-MCNC: 3.5 G/DL (ref 2–4)
GLUCOSE SERPL-MCNC: 77 MG/DL (ref 74–106)
GLUCOSE UR STRIP-MCNC: NEGATIVE MG/DL
HCT VFR BLD AUTO: 41 % (ref 40–54)
HGB BLD-MCNC: 13.9 G/DL (ref 13.5–18)
IMM GRANULOCYTES # BLD AUTO: 0.03 K/UL (ref 0–0.04)
IMM GRANULOCYTES NFR BLD: 0.3 % (ref 0–0.4)
KETONES UR STRIP-SCNC: NEGATIVE MG/DL
LEUKOCYTE ESTERASE UR QL STRIP: NEGATIVE
LYMPHOCYTES # BLD AUTO: 3.66 K/UL (ref 1–4.8)
LYMPHOCYTES NFR BLD AUTO: 38.3 % (ref 27–41)
MCH RBC QN AUTO: 31.1 PG (ref 27–31)
MCHC RBC AUTO-ENTMCNC: 33.9 G/DL (ref 32–36)
MCV RBC AUTO: 91.7 FL (ref 80–96)
MONOCYTES # BLD AUTO: 0.94 K/UL (ref 0–0.8)
MONOCYTES NFR BLD AUTO: 9.8 % (ref 2–6)
MPC BLD CALC-MCNC: 10 FL (ref 9.4–12.4)
NEUTROPHILS # BLD AUTO: 4.85 K/UL (ref 1.8–7.7)
NEUTROPHILS NFR BLD AUTO: 50.8 % (ref 53–65)
NITRITE UR QL STRIP: NEGATIVE
PH UR STRIP: 6 PH UNITS
PLATELET # BLD AUTO: 222 K/UL (ref 150–400)
POTASSIUM SERPL-SCNC: 3.9 MMOL/L (ref 3.5–5.1)
PROT SERPL-MCNC: 7 G/DL (ref 6.4–8.2)
PROT UR QL STRIP: NEGATIVE
RBC # BLD AUTO: 4.47 M/UL (ref 4.6–6.2)
RBC # UR STRIP: NEGATIVE /UL
SARS-COV+SARS-COV-2 AG RESP QL IA.RAPID: NEGATIVE
SODIUM SERPL-SCNC: 140 MMOL/L (ref 136–145)
SP GR UR STRIP: 1.01
TROPONIN I SERPL HS-MCNC: 8.3 PG/ML
UROBILINOGEN UR STRIP-ACNC: 0.2 MG/DL
WBC # BLD AUTO: 9.56 K/UL (ref 4.5–11)

## 2022-10-01 PROCEDURE — 80053 COMPREHEN METABOLIC PANEL: CPT | Performed by: SPECIALIST

## 2022-10-01 PROCEDURE — 99283 EMERGENCY DEPT VISIT LOW MDM: CPT | Mod: ,,, | Performed by: SPECIALIST

## 2022-10-01 PROCEDURE — 81003 URINALYSIS AUTO W/O SCOPE: CPT | Performed by: SPECIALIST

## 2022-10-01 PROCEDURE — 99283 EMERGENCY DEPT VISIT LOW MDM: CPT

## 2022-10-01 PROCEDURE — 87428 SARSCOV & INF VIR A&B AG IA: CPT | Performed by: SPECIALIST

## 2022-10-01 PROCEDURE — 85025 COMPLETE CBC W/AUTO DIFF WBC: CPT | Performed by: SPECIALIST

## 2022-10-01 PROCEDURE — 84484 ASSAY OF TROPONIN QUANT: CPT | Performed by: SPECIALIST

## 2022-10-01 PROCEDURE — 99283 PR EMERGENCY DEPT VISIT,LEVEL III: ICD-10-PCS | Mod: ,,, | Performed by: SPECIALIST

## 2022-10-01 RX ORDER — KETOROLAC TROMETHAMINE 10 MG/1
10 TABLET, FILM COATED ORAL EVERY 8 HOURS
COMMUNITY
Start: 2022-08-29

## 2022-10-01 RX ORDER — HYDROXYZINE PAMOATE 25 MG/1
25-50 CAPSULE ORAL EVERY 6 HOURS PRN
COMMUNITY
Start: 2022-08-18

## 2022-10-01 RX ORDER — OMEPRAZOLE 40 MG/1
1 CAPSULE, DELAYED RELEASE ORAL DAILY
COMMUNITY

## 2022-10-01 NOTE — ED NOTES
"REVIEWED DC INSTRUCTIONS WITH PT. PT REQUESTED STAFF FAX HIS MEDICAL RECORDS FROM THIS VISIT"[facility] INFORMED PT HE WOULD HAVE TO COME TO MEDICAL RECORDS Monday TO SIGN RELEASE AND HAVE RECORDS FAXED OR HE COULD HAVE HIS DR @ [facility] SENT A RELEASE. PT STATES, "I'LL COME DO IT Monday. I HAVE TO DO ALL OF THAT." PT FURTHER STATES, "WHY THAT DR AMY DASILVA? I WAS TRYING TO ASK HER A QUESTION AND SHE JUST WALKED OUT. SHE'S SO RUDE." ED MD NAME PROVIDED TO PT UPON REQUEST.  "

## 2022-10-01 NOTE — ED PROVIDER NOTES
"  Patient cardiac workup is totally normal and patient discharged in stable condition.  He received a CT of Chest with his previous visit to our ER with a normal CT therefore no other imaging is necessary.  He is to [facility]    Patient has told nursing that "Dr. Guevara is rude".  He also states that his last visits here have been with rude doctors.Encounter Date: 10/1/2022       History     Chief Complaint   Patient presents with    Back Pain     Patient is a 63 yo AA male with a history of depression with associated anxiety, hypertension, migraines, sarcoidosis and on anticoagulant therapy who has multiple chronic complaints for which he has been to this ER prior to this visit.  In fact, he mentions that he has been "ER to ER for this same problem and no one is helping me".  [facility] for this issue.  This is a chronic issue and I have explained to the patient that the ER is best accessed for emergency diagnosis and treatment and that most patients can fail to get continuity of care through the ER.  He became angry and he states that he is "very much an emergency". In triage he is talking about generalized aches and pain with associated back pain and chest pain that are throbbing and travel along his body from his chest to his abdomen and pelvis area.  He has had a recent heart cath which was normal.  He also has been to various ER's because his physicians have "not found anything wrong with him and his blood work is normal" but he is "dying".  So he continues to visit ER's when he feels this way because he states that his doctor tells him to. He is very frustrated at this outcome and again I have advised him to stick with his primary working this up as he may need very specific referrals to identify his medical problem.  His cath was normal per physician notes. He has no true "fever" documented by checking a thermometer.     Review of " patient's allergies indicates:  No Known Allergies  Past Medical History:   Diagnosis Date    Anticoagulant long-term use     Coronary artery disease     Depression     Gastritis, Helicobacter pylori     GERD (gastroesophageal reflux disease)     Hypertension     Migraine headache     Sarcoidosis, unspecified      Past Surgical History:   Procedure Laterality Date    adrenal gland mass      HEMORRHOID SURGERY      right fusion ankle Right     rotator cuff JOHN surgery       History reviewed. No pertinent family history.  Social History     Tobacco Use    Smoking status: Every Day     Packs/day: 1.00     Years: 25.00     Pack years: 25.00     Types: Cigarettes    Smokeless tobacco: Never   Substance Use Topics    Alcohol use: Yes     Alcohol/week: 1.0 standard drink     Types: 1 Cans of beer per week    Drug use: Never     Comment: as a kid     Review of Systems   Constitutional:  Positive for fatigue. Negative for activity change, appetite change, chills and diaphoresis.   HENT: Negative.  Negative for congestion, dental problem and drooling.    Eyes: Negative.    Respiratory: Negative.  Negative for apnea, cough, choking, chest tightness, shortness of breath, wheezing and stridor.    Cardiovascular:  Positive for chest pain.   Gastrointestinal:  Negative for abdominal distention, abdominal pain, anal bleeding, blood in stool, constipation, diarrhea, nausea and vomiting.   Endocrine: Negative.  Negative for cold intolerance.   Genitourinary: Negative.    Musculoskeletal:  Positive for back pain.   Skin: Negative.  Negative for rash and wound.   Allergic/Immunologic: Negative.    Neurological:  Positive for weakness. Negative for dizziness, tremors, seizures, syncope, facial asymmetry, speech difficulty, light-headedness, numbness and headaches.   Hematological: Negative.    Psychiatric/Behavioral:  Positive for agitation and sleep disturbance. The patient is nervous/anxious.      Physical Exam     Initial Vitals  [10/01/22 0025]   BP Pulse Resp Temp SpO2   (!) 135/99 94 18 97.8 °F (36.6 °C) 98 %      MAP       --         Physical Exam    Nursing note and vitals reviewed.  Constitutional: He appears well-developed and well-nourished. No distress.   Appears very nervous and agitated   HENT:   Head: Normocephalic and atraumatic.   Eyes: Pupils are equal, round, and reactive to light.   Neck: Neck supple.   Normal range of motion.  Cardiovascular:  Normal rate.           Pulmonary/Chest: Breath sounds normal.   Abdominal: Abdomen is soft.   Musculoskeletal:         General: Normal range of motion.      Cervical back: Normal range of motion and neck supple.     Neurological: He is alert and oriented to person, place, and time. He has normal strength. He displays normal reflexes. No cranial nerve deficit or sensory deficit. GCS score is 15. GCS eye subscore is 4. GCS verbal subscore is 5. GCS motor subscore is 6.   Skin: Skin is warm.       Medical Screening Exam   See Full Note    ED Course   Procedures  Labs Reviewed   CBC WITH DIFFERENTIAL - Abnormal; Notable for the following components:       Result Value    RBC 4.47 (*)     MCH 31.1 (*)     RDW 15.4 (*)     Neutrophils % 50.8 (*)     Monocytes % 9.8 (*)     Eosinophils % 0.6 (*)     Monocytes, Absolute 0.94 (*)     All other components within normal limits   TROPONIN I - Normal   SARS-COV2 (COVID) W/ FLU ANTIGEN - Normal    Narrative:     Negative SARS-CoV results should not be used as the sole basis for treatment or patient management decisions; negative results should be considered in the context of a patient's recent exposures, history and the presene of clinical signs and symptoms consistent with COVID-19.  Negative results should be treated as presumptive and confirmed by molecular assay, if necessary for patient management.   CBC W/ AUTO DIFFERENTIAL    Narrative:     The following orders were created for panel order CBC auto differential.  Procedure                                Abnormality         Status                     ---------                               -----------         ------                     CBC with Differential[[id number]]        Abnormal            Final result                 Please view results for these tests on the individual orders.   COMPREHENSIVE METABOLIC PANEL   URINALYSIS, REFLEX TO URINE CULTURE   DRUG SCREEN, URINE (BEAKER)          Imaging Results    None          Medications - No data to display                    Clinical Impression:   Final diagnoses:  [R07.89] Atypical chest pain (Primary)  [F32.A] Chronic depression  [F41.9] Chronic anxiety      ED Disposition Condition    Discharge Stable          ED Prescriptions    None       Follow-up Information    None          Kitty Guevara MD  10/01/22 0214       Kitty Guevara MD  10/02/22 1615       Kitty Guevara MD  10/02/22 1610

## 2022-10-01 NOTE — DISCHARGE INSTRUCTIONS
Follow up with your physician for chronic issues Contact him or her next week for follow up as you may need a referral to neurology or a [facility] or [facility]

## 2022-10-01 NOTE — ED NOTES
"Patient came to nurses station and told Dr. Guevara "thank you for taking care of me, but you have a bad attitude and are rude. I am sick and I don't need you to be disrespectful and make me feel worse for being here." Dr. Guevara told the patient that she was not rude and that he had been here multiple times with the same complaint. That his labs were normal and he needs [facility]. Patient told MD that he was not the one to be disrespected and was going to make a compliant. Dr. Orozco told patient that was fine. Patient thanked Nan and I for taking good care of him. I nodded at patient and told him to have a safe trip. Patient left ER.   "

## 2022-10-01 NOTE — ED TRIAGE NOTES
"C/O back pain as well as generalized aches and pains that feel like throbbing as they "travel down his body" x 1 month. States he has been to a few ERs and [facility]. Was told labs were normal and to go to ER if the issue continued. States his blood pressure was 150s over 100s tonight and he took a clonidine 0.1 mg approx. An hour ago.   "

## 2023-07-14 DIAGNOSIS — M54.40 CHRONIC LOW BACK PAIN WITH SCIATICA: Primary | ICD-10-CM

## 2023-07-14 DIAGNOSIS — G89.29 CHRONIC LOW BACK PAIN WITH SCIATICA: Primary | ICD-10-CM

## 2023-08-04 DIAGNOSIS — M54.16 LUMBAR RADICULOPATHY: Primary | ICD-10-CM

## 2023-08-07 ENCOUNTER — OFFICE VISIT (OUTPATIENT)
Dept: SPINE | Facility: CLINIC | Age: 63
End: 2023-08-07
Payer: OTHER GOVERNMENT

## 2023-08-07 ENCOUNTER — HOSPITAL ENCOUNTER (OUTPATIENT)
Dept: RADIOLOGY | Facility: HOSPITAL | Age: 63
Discharge: HOME | End: 2023-08-07
Attending: ORTHOPAEDIC SURGERY
Payer: OTHER GOVERNMENT

## 2023-08-07 VITALS
RESPIRATION RATE: 16 BRPM | BODY MASS INDEX: 24.8 KG/M2 | TEMPERATURE: 97 F | HEART RATE: 81 BPM | WEIGHT: 188 LBS | OXYGEN SATURATION: 97 % | SYSTOLIC BLOOD PRESSURE: 108 MMHG | DIASTOLIC BLOOD PRESSURE: 64 MMHG

## 2023-08-07 DIAGNOSIS — M54.16 LUMBAR RADICULOPATHY: ICD-10-CM

## 2023-08-07 DIAGNOSIS — M54.16 LUMBAR RADICULOPATHY: Primary | ICD-10-CM

## 2023-08-07 DIAGNOSIS — M25.551 RIGHT HIP PAIN: ICD-10-CM

## 2023-08-07 DIAGNOSIS — M25.511 RIGHT SHOULDER PAIN, UNSPECIFIED CHRONICITY: ICD-10-CM

## 2023-08-07 PROCEDURE — 99204 OFFICE O/P NEW MOD 45 MIN: CPT | Mod: 25,S$PBB,ICN, | Performed by: ORTHOPAEDIC SURGERY

## 2023-08-07 PROCEDURE — 99204 PR OFFICE/OUTPT VISIT, NEW, LEVL IV, 45-59 MIN: ICD-10-PCS | Mod: 25,S$PBB,ICN, | Performed by: ORTHOPAEDIC SURGERY

## 2023-08-07 PROCEDURE — 99406 PR TOBACCO USE CESSATION INTERMEDIATE 3-10 MINUTES: ICD-10-PCS | Mod: S$PBB,ICN,, | Performed by: ORTHOPAEDIC SURGERY

## 2023-08-07 PROCEDURE — 72110 X-RAY EXAM L-2 SPINE 4/>VWS: CPT | Mod: 26,,, | Performed by: ORTHOPAEDIC SURGERY

## 2023-08-07 PROCEDURE — 72110 X-RAY EXAM L-2 SPINE 4/>VWS: CPT | Mod: TC

## 2023-08-07 PROCEDURE — 99406 BEHAV CHNG SMOKING 3-10 MIN: CPT | Mod: S$PBB,ICN,, | Performed by: ORTHOPAEDIC SURGERY

## 2023-08-07 PROCEDURE — 72110 XR LUMBAR SPINE 4-5 VIEW WITH BENDING VIEWS: ICD-10-PCS | Mod: 26,,, | Performed by: ORTHOPAEDIC SURGERY

## 2023-08-07 PROCEDURE — 99215 OFFICE O/P EST HI 40 MIN: CPT | Mod: PBBFAC | Performed by: ORTHOPAEDIC SURGERY

## 2023-08-07 RX ORDER — METHYLPREDNISOLONE 4 MG/1
1 TABLET ORAL
COMMUNITY
Start: 2023-08-02

## 2023-08-07 RX ORDER — GABAPENTIN 300 MG/1
300 CAPSULE ORAL 3 TIMES DAILY
COMMUNITY

## 2023-08-07 NOTE — PROGRESS NOTES
AP, lateral, flexion/extension views of the lumbar spine reviewed    On the AP there is normal coronal alignment.  Moderate to severe right hip arthritis. There are 5 non-rib-bearing lumbar vertebrae.  On the lateral there is decreased lumbar lordosis.  There is spondylotic disease with decreased disc height and osteophyte formation noted.  No fractures or listhesis noted.  No instability on flexion-extension views.    Impression:  Spondylotic changes of the lumbar spine as noted above
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
## 2023-08-07 NOTE — LETTER
August 7, 2023      [facility]  [address] [place] [address] [place] [address]  Phone: [phone]  Fax: [phone]       Patient: Parish Smith   YOB: 1960  Date of Visit: 08/07/2023    To Whom It May Concern:    Yamileth Smith  was at [facility] on 08/07/2023. If you have any questions or concerns, or if I can be of further assistance, please do not hesitate to contact me.    Sincerely,    Dr. Thad Moseley

## 2023-08-07 NOTE — PROGRESS NOTES
Smoking Cessation counseling    Time spent:  3-10 minutes (29796)    We discussed the importance, over both the short and long-term, of smoking cessation; reviewed the patient's willingness to quit; overall history and current use of tobacco smoking products; that there are a variety of methods to help with smoking diminution and cessation (stopping alone, using nicotine substitution medications/gums, Chantix, other medications, etcetera, which the patient will also discuss with his or her primary care physician); that the patient should set a date for smoking cessation; and the patient will follow up with his or her primary care physician for further support and oversight.    We also discussed that for the patient to have surgery while using nicotine, wound healing may be compromised relative to those who do not smoke; that recovery from anesthesia may sometimes be more difficult; and that quitting may decrease the heart, vascular, pulmonary effects in the short term as well as over the long term.  Smoking cessation may be useful and important for any patient who will have a fusion as part of surgery or have bone or tissue that has regrown heal (bone fusions, wound closures, etc.)  since surgical results with respect to wound healing, susceptibility to recovery from infection, bone fusion, and tissue and blood vessel health may be impaired or less optimal in smokers than nonsmokers.  We talked about the elevated risk of surgical bone fusion failure in the setting of smoking and the potential need for a higher-risk revision surgery should fusion not occur.    I reviewed this with the patient in detail and all questions were answered.  We discussed nature of the patient's condition; real alternatives and realistic options; pros and cons, risks and benefits of all the options, in detail.  I believe the patient understands the above and wishes to proceed as outlined.